# Patient Record
Sex: FEMALE | Race: WHITE
[De-identification: names, ages, dates, MRNs, and addresses within clinical notes are randomized per-mention and may not be internally consistent; named-entity substitution may affect disease eponyms.]

---

## 2022-06-04 ENCOUNTER — HOSPITAL ENCOUNTER (EMERGENCY)
Dept: HOSPITAL 97 - ER | Age: 22
Discharge: HOME | End: 2022-06-04
Payer: COMMERCIAL

## 2022-06-04 VITALS — TEMPERATURE: 98.5 F | DIASTOLIC BLOOD PRESSURE: 60 MMHG | SYSTOLIC BLOOD PRESSURE: 109 MMHG | OXYGEN SATURATION: 97 %

## 2022-06-04 DIAGNOSIS — Z23: ICD-10-CM

## 2022-06-04 DIAGNOSIS — S51.812A: Primary | ICD-10-CM

## 2022-06-04 PROCEDURE — 90471 IMMUNIZATION ADMIN: CPT

## 2022-06-04 PROCEDURE — 90714 TD VACC NO PRESV 7 YRS+ IM: CPT

## 2022-06-04 PROCEDURE — 99283 EMERGENCY DEPT VISIT LOW MDM: CPT

## 2022-06-04 PROCEDURE — 0JQH0ZZ REPAIR LEFT LOWER ARM SUBCUTANEOUS TISSUE AND FASCIA, OPEN APPROACH: ICD-10-PCS

## 2022-06-04 NOTE — ER
Nurse's Notes                                                                                     

 [facility]                                                                 

Name: Sol Steward                                                                              

Age: 22 yrs                                                                                       

Sex: Female                                                                                       

: 2000                                                                                   

MRN: [id number]                                                                                   

Arrival Date: 2022                                                                          

Time: 11:47                                                                                       

Account#: [id number]                                                                            

Bed 9                                                                                             

Private MD:                                                                                       

Diagnosis: Laceration without foreign body of left forearm                                        

                                                                                                  

Presentation:                                                                                     

                                                                                             

11:54 Chief complaint: Patient states: L arm cut with broken beach umbrella 30 min PTA. <3 cm ll1 

      laceration, bleeding controlled. PMS intact. Coronavirus screen: Vaccine status:            

      Patient reports being unvaccinated. Client denies travel out of the [place] in the last 14     

      days. At this time, the client does not indicate any symptoms associated with               

      coronavirus-19. Ebola Screen: Patient denies travel to an Ebola-affected area in the 21     

      days before illness onset. Initial Sepsis Screen: Does the patient meet any 2 criteria?     

      HR > 90 bpm. No. Patient's initial sepsis screen is negative. Does the patient have a       

      suspected source of infection? Yes: Skin breakdown/wound. Risk Assessment: Do you want      

      to hurt yourself or someone else? Patient reports no desire to harm self or others.         

      Onset of symptoms was 2022.                                                        

11:54 Method Of Arrival: Ambulatory                                                           ll1 

11:54 Acuity: URSULA 4                                                                           ll1 

                                                                                                  

Triage Assessment:                                                                                

11:56 General: Appears in no apparent distress. Behavior is calm, cooperative, appropriate    ll1 

      for age. Pain: Complains of pain in left arm Pain currently is 2 out of 10 on a pain        

      scale. Quality of pain is described as burning, aching. Derm: Reports laceration L FA.      

      Musculoskeletal: Circulation, motion, and sensation intact. Capillary refill < 3            

      seconds. Injury Description: Laceration.                                                    

                                                                                                  

OB/GYN:                                                                                           

12:00 LMP 2022                                                                           [email or web address] 

                                                                                                  

Historical:                                                                                       

- Allergies:                                                                                      

11:56 No Known Allergies;                                                                     ll1 

- PMHx:                                                                                           

11:56 None;                                                                                   ll1 

- PSHx:                                                                                           

11:56 None;                                                                                   ll1 

                                                                                                  

- Immunization history:: Client reports having NOT received the Covid vaccine. Last               

  tetanus immunization: unknown.                                                                  

- Social history:: Smoking status: Patient denies any tobacco usage or history of.                

                                                                                                  

                                                                                                  

Screenin:01 Abuse screen: Denies threats or abuse. Denies injuries from another. Nutritional        jg9 

      screening: No deficits noted. Tuberculosis screening: No symptoms or risk factors           

      identified. Fall Risk None identified.                                                      

                                                                                                  

Assessment:                                                                                       

12:00 Reassessment: Patient appears in no apparent distress at this time. Patient is alert,   jg9 

      oriented x 3, equal unlabored respirations, skin warm/dry/pink. gaping laceration <1cm      

      noted to left anterior mid forearm, patient reports she got cut by a tent pole she was      

      attempting to remove. . Derm: Skin Wound noted left arm Wound is <1 cm laceration.          

                                                                                                  

Vital Signs:                                                                                      

11:54  / 91; Pulse 99; Resp 16; Temp 98.5; Pulse Ox 99% ; Weight 81.65 kg; Height 5 ft. ll1 

      3 in. (160.02 cm); Pain 2/10;                                                               

12:00  / 89; Pulse 98; Resp 12 S; Pulse Ox 100% on R/A; Pain 0/10;                      jg9 

12:30  / 60; Pulse 89; Resp 14 S; Pulse Ox 97% on R/A; Pain 0/10;                       jg9 

11:54 Body Mass Index 31.89 (81.65 kg, 160.02 cm)                                             1 

                                                                                                  

ED Course:                                                                                        

11:47 Patient arrived in ED.                                                                  jj6 

11:54 Liliana David, RN is Primary Nurse.                                                 jg9 

11:54 Arm band placed on Patient placed in an exam room, on a stretcher.                      ll1 

11:56 Triage completed.                                                                       ll1 

11:59 Ricardo Black PA is PHCP.                                                                cp  

11:59 Ricardo Jarquin MD is Attending Physician.                                             cp  

12:02 Patient has correct armband on for positive identification. Bed in low position. Call   jg9 

      light in reach. Side rails up X 1.                                                          

12:40 No provider procedures requiring assistance completed.                                  jg9 

12:40 Patient did not have IV access during this emergency room visit.                        jg9 

                                                                                                  

Administered Medications:                                                                         

12:15 Drug: Tetanus-Diphtheria Toxoid Adult 0.5 ml {: Cuil. Exp:         jg9 

      2024. Lot #: [id number]. } Route: IM; Site: left deltoid;                                  

12:36 Follow up: Response: No adverse reaction                                                jg9 

12:16 Drug: Lidocaine-Epinephrine -1%: (1:100,000) 5 ml {Note: left forearm laceration.}      jg9 

      Volume: 20 ml; Route: Infiltration;                                                         

12:36 Follow up: Response: No adverse reaction; Pain is decreased                             jg9 

                                                                                                  

                                                                                                  

Medication:                                                                                       

12:02 VIS not applicable for this client.                                                     jg9 

                                                                                                  

Outcome:                                                                                          

12:36 Discharge ordered by MD.                                                                vega  

12:40 Discharged to home ambulatory.                                                          jg9 

12:40 Condition: stable                                                                           

12:40 Discharge instructions given to patient, Instructed on discharge instructions, follow       

      up and referral plans. Demonstrated understanding of instructions, follow-up care.          

12:40 Patient left the ED.                                                                    jg9 

                                                                                                  

Signatures:                                                                                       

Ricardo Black PA PA cp Lewis, Lynsay, RN                       RN   ll1                                                  

Liliana Figueroa                           jj6                                                  

Liliana David RN                   RN   jg9                                                  

                                                                                                  

**************************************************************************************************

## 2022-06-04 NOTE — EDPHYS
Physician Documentation                                                                           

 [facility]                                                                 

Name: Sol Steward                                                                              

Age: 22 yrs                                                                                       

Sex: Female                                                                                       

: 2000                                                                                   

MRN: [id number]                                                                                   

Arrival Date: 2022                                                                          

Time: 11:47                                                                                       

Account#: [id number]                                                                            

Bed 9                                                                                             

Private MD:                                                                                       

ED Physician Ricardo Jarquin                                                                      

HPI:                                                                                              

                                                                                             

12:15 This 22 yrs old Female presents to ER via Ambulatory with complaints of Arm Injury,     cp  

      Laceration To Arm.                                                                          

12:15 The patient or guardian complains of a laceration, clean. The complaints affect the     cp  

      volar side left forearm. Context: resulted from broken piece of beach umbrella. Onset:      

      The symptoms/episode began/occurred just prior to arrival. Treatment prior to arrival       

      includes: no previous treatment. Associated signs and symptoms: The patient has no          

      apparent associated signs or symptoms.                                                      

                                                                                                  

OB/GYN:                                                                                           

12:00 LMP 2022                                                                           jg9 

                                                                                                  

Historical:                                                                                       

- Allergies:                                                                                      

11:56 No Known Allergies;                                                                     ll1 

- PMHx:                                                                                           

11:56 None;                                                                                   ll1 

- PSHx:                                                                                           

11:56 None;                                                                                   ll1 

                                                                                                  

- Immunization history:: Client reports having NOT received the Covid vaccine. Last               

  tetanus immunization: unknown.                                                                  

- Social history:: Smoking status: Patient denies any tobacco usage or history of.                

                                                                                                  

                                                                                                  

ROS:                                                                                              

12:18 Eyes: Negative for injury, pain, redness, and discharge.                                cp  

12:18 Constitutional: Negative for body aches, chills, fever.                                     

12:18 Neck: Negative for pain with movement, pain at rest, stiffness.                             

12:18 Cardiovascular: Negative for chest pain.                                                    

12:18 Respiratory: Negative for cough, shortness of breath, wheezing.                             

12:18 Abdomen/GI: Negative for abdominal pain, nausea, vomiting, and diarrhea.                    

12:18 Back: Negative for pain at rest, pain with movement.                                        

12:18 Skin: Positive for laceration(s), of the left forearm.                                      

12:18 Neuro: Negative for altered mental status, headache, numbness, tingling, weakness.          

12:18 All other systems are negative.                                                             

                                                                                                  

Exam:                                                                                             

12:25 Constitutional: The patient appears in no acute distress, alert, awake, comfortable,    cp  

      well developed, well nourished.                                                             

12:25 Head/Face:  Normocephalic, atraumatic.                                                  cp  

12:25 Cardiovascular: Rate: normal, Pulses: Pulses are 2+ in left radial artery.                  

12:25 Respiratory: the patient does not display signs of respiratory distress,  Respirations:     

      normal, no use of accessory muscles, no retractions, labored breathing, is not present,     

      Breath sounds: are clear throughout.                                                        

12:25 Musculoskeletal/extremity: Extremities: grossly normal except: noted in the volar side      

      left forearm: laceration, tenderness, ROM: full active range of motion, in the left         

      hand, Perfusion: the extremity is normally perfused throughout, the  left hand              

      Sensation intact.                                                                           

                                                                                                  

Vital Signs:                                                                                      

11:54  / 91; Pulse 99; Resp 16; Temp 98.5; Pulse Ox 99% ; Weight 81.65 kg; Height 5 ft. ll1 

      3 in. (160.02 cm); Pain 2/10;                                                               

12:00  / 89; Pulse 98; Resp 12 S; Pulse Ox 100% on R/A; Pain 0/10;                      jg9 

12:30  / 60; Pulse 89; Resp 14 S; Pulse Ox 97% on R/A; Pain 0/10;                       jg9 

11:54 Body Mass Index 31.89 (81.65 kg, 160.02 cm)                                             ll1 

                                                                                                  

Laceration:                                                                                       

12:35 Wound Repair of 3.5cm ( 1.4in ) subcutaneous laceration to volar side left forearm.     cp  

      Linear shaped.. Distal neuro/vascular/tendon intact. Anesthesia: Wound infiltrated with     

      5 mls of 1% lidocaine w/ Epi. Wound prep: Moderate cleansing by me, Wound irrigation by     

      me. Skin closed with 5 4-0 Prolene using interrupted sutures and sterile technique.         

      Dressed with 4x4's. Patient tolerated well.                                                 

                                                                                                  

MDM:                                                                                              

11:59 Patient medically screened.                                                             cp  

12:15 Differential diagnosis: open fracture, simple laceration, tendon injury.                cp  

12:36 Data reviewed: vital signs, nurses notes, and as a result, I will discharge patient.    cp  

12:36 Counseling: I had a detailed discussion with the patient and/or guardian regarding: the cp  

      historical points, exam findings, and any diagnostic results supporting the                 

      discharge/admit diagnosis, the need for outpatient follow up, a family practitioner, to     

      return to the emergency department if symptoms worsen or persist or if there are any        

      questions or concerns that arise at home.                                                   

                                                                                                  

                                                                                             

12:10 Order name: Dressing - Wound; Complete Time: 12:36                                      cp  

                                                                                             

12:10 Order name: Gloves, Sterile; Complete Time: 12:13                                       cp  

                                                                                             

12:10 Order name: Setup Suture Tray; Complete Time: 12:13                                     cp  

                                                                                             

12:36 Order name: Wound dressing; Complete Time: 12:36                                        cp  

                                                                                                  

Administered Medications:                                                                         

12:15 Drug: Tetanus-Diphtheria Toxoid Adult 0.5 ml {: Xirrus. Exp:         jg9 

      2024. Lot #: [id number]. } Route: IM; Site: left deltoid;                                  

12:36 Follow up: Response: No adverse reaction                                                jg9 

12:16 Drug: Lidocaine-Epinephrine -1%: (1:100,000) 5 ml {Note: left forearm laceration.}      jg9 

      Volume: 20 ml; Route: Infiltration;                                                         

12:36 Follow up: Response: No adverse reaction; Pain is decreased                             jg9 

                                                                                                  

                                                                                                  

Disposition Summary:                                                                              

22 12:36                                                                                    

Discharge Ordered                                                                                 

      Location: Home                                                                          cp  

      Problem: new                                                                            cp  

      Symptoms: have improved                                                                 cp  

      Condition: Stable                                                                       cp  

      Diagnosis                                                                                   

        - Laceration without foreign body of left forearm                                     cp  

      Followup:                                                                               cp  

        - With: Private Physician                                                                  

        - When: 7 - 10 days                                                                        

        - Reason: Staple/Suture removal                                                            

      Discharge Instructions:                                                                     

        - Discharge Summary Sheet                                                             cp  

        - Laceration Care, Adult                                                              cp  

      Forms:                                                                                      

        - Medication Reconciliation Form                                                      cp  

        - Thank You Letter                                                                    cp  

        - Antibiotic Education                                                                cp  

        - Prescription Opioid Use                                                             cp  

Signatures:                                                                                       

Ricardo Black PA PA cp Lewis, Lynsay, RN                       RN   ll1                                                  

Liliana David RN                   RN   jg9                                                  

                                                                                                  

**************************************************************************************************